# Patient Record
Sex: FEMALE | Race: BLACK OR AFRICAN AMERICAN | NOT HISPANIC OR LATINO | Employment: UNEMPLOYED | URBAN - METROPOLITAN AREA
[De-identification: names, ages, dates, MRNs, and addresses within clinical notes are randomized per-mention and may not be internally consistent; named-entity substitution may affect disease eponyms.]

---

## 2019-05-09 PROBLEM — I10 CHRONIC HYPERTENSION: Status: ACTIVE | Noted: 2019-04-25

## 2019-05-09 PROBLEM — F32.A DEPRESSION: Status: ACTIVE | Noted: 2019-04-25

## 2019-05-09 PROBLEM — T74.91XA DOMESTIC ABUSE OF ADULT: Status: ACTIVE | Noted: 2019-04-25

## 2019-05-09 PROBLEM — F41.9 ANXIETY: Status: ACTIVE | Noted: 2019-04-25

## 2019-05-09 PROBLEM — Z87.891 HISTORY OF TOBACCO ABUSE: Status: ACTIVE | Noted: 2019-04-25

## 2021-05-12 ENCOUNTER — PATIENT MESSAGE (OUTPATIENT)
Dept: RESEARCH | Facility: HOSPITAL | Age: 36
End: 2021-05-12

## 2021-07-01 ENCOUNTER — PATIENT MESSAGE (OUTPATIENT)
Dept: ADMINISTRATIVE | Facility: OTHER | Age: 36
End: 2021-07-01

## 2022-09-23 PROBLEM — I82.90 THROMBOSIS: Status: ACTIVE | Noted: 2022-09-23

## 2022-09-23 PROBLEM — B37.9 YEAST INFECTION: Status: ACTIVE | Noted: 2022-09-23

## 2022-09-23 PROBLEM — Z86.718 HISTORY OF DVT (DEEP VEIN THROMBOSIS): Status: ACTIVE | Noted: 2022-09-23

## 2022-09-25 PROBLEM — B37.31 VAGINAL YEAST INFECTION: Status: ACTIVE | Noted: 2022-09-25

## 2022-09-25 PROBLEM — I82.890 THROMBOSIS OF OVARIAN VEIN: Status: ACTIVE | Noted: 2022-09-25

## 2022-10-07 PROBLEM — I82.402 LEFT LEG DVT: Status: ACTIVE | Noted: 2022-10-07

## 2022-10-10 PROBLEM — Z79.01 ANTICOAGULATED: Status: ACTIVE | Noted: 2022-10-10

## 2022-10-10 PROBLEM — N83.202 CYST OF LEFT OVARY: Status: ACTIVE | Noted: 2022-10-10

## 2022-11-03 PROBLEM — D50.0 IRON DEFICIENCY ANEMIA DUE TO CHRONIC BLOOD LOSS: Status: ACTIVE | Noted: 2022-11-03

## 2022-11-14 PROBLEM — R87.610 ATYPICAL SQUAMOUS CELL CHANGES OF UNDETERMINED SIGNIFICANCE (ASCUS) ON CERVICAL CYTOLOGY WITH POSITIVE HIGH RISK HUMAN PAPILLOMA VIRUS (HPV): Status: ACTIVE | Noted: 2022-11-14

## 2022-11-14 PROBLEM — R87.810 ATYPICAL SQUAMOUS CELL CHANGES OF UNDETERMINED SIGNIFICANCE (ASCUS) ON CERVICAL CYTOLOGY WITH POSITIVE HIGH RISK HUMAN PAPILLOMA VIRUS (HPV): Status: ACTIVE | Noted: 2022-11-14

## 2023-01-10 PROBLEM — K04.7 DENTAL ABSCESS: Status: ACTIVE | Noted: 2023-01-10

## 2023-01-27 PROBLEM — Z00.00 PREVENTATIVE HEALTH CARE: Status: ACTIVE | Noted: 2023-01-27

## 2023-01-27 PROBLEM — R10.2 PELVIC PAIN: Status: ACTIVE | Noted: 2023-01-27

## 2023-02-02 PROBLEM — Z76.5 DRUG-SEEKING BEHAVIOR: Status: ACTIVE | Noted: 2023-02-02

## 2023-05-01 PROBLEM — Z00.00 PREVENTATIVE HEALTH CARE: Status: RESOLVED | Noted: 2023-01-27 | Resolved: 2023-05-01

## 2023-05-17 PROBLEM — R11.0 NAUSEA: Status: ACTIVE | Noted: 2023-05-17

## 2023-05-17 PROBLEM — D64.9 ANEMIA, UNSPECIFIED: Status: RESOLVED | Noted: 2023-05-17 | Resolved: 2023-05-17

## 2023-05-17 PROBLEM — D21.9 FIBROIDS: Status: ACTIVE | Noted: 2023-05-17

## 2023-05-17 PROBLEM — K37 APPENDICITIS: Status: ACTIVE | Noted: 2023-05-17

## 2023-05-17 PROBLEM — D64.9 ANEMIA, UNSPECIFIED: Status: ACTIVE | Noted: 2023-05-17

## 2023-05-17 PROBLEM — Z01.818 PREOPERATIVE CLEARANCE: Status: ACTIVE | Noted: 2023-05-17

## 2023-05-17 PROBLEM — E87.1 HYPONATREMIA: Status: ACTIVE | Noted: 2023-05-17

## 2023-05-17 PROBLEM — R10.30 LOWER ABDOMINAL PAIN: Status: ACTIVE | Noted: 2023-05-17

## 2023-05-19 PROBLEM — Z71.6 ENCOUNTER FOR TOBACCO USE CESSATION COUNSELING: Status: ACTIVE | Noted: 2023-05-19

## 2023-05-19 PROBLEM — Z72.0 TOBACCO ABUSE: Status: ACTIVE | Noted: 2023-05-19

## 2023-05-31 ENCOUNTER — SOCIAL WORK (OUTPATIENT)
Dept: ADMINISTRATIVE | Facility: OTHER | Age: 38
End: 2023-05-31
Payer: MEDICAID

## 2023-05-31 NOTE — PROGRESS NOTES
SW received a secure chat message from Dr. Norton stating pt is interested in purchasing commercial insurance. SW placed a phone call to pt @ [phone] to discuss. SW provided contact information (phone # and website) to inquire about purchasing a commercial plan through the Marketplace. SW also informed that if possible, insurance options could be possible through an employer during open enrollment. Pt verbalized understanding. No other needs were mentioned during this time.     BELKIS Joyce    [phone] (phone)  [phone] (fax)

## 2023-05-31 NOTE — PROGRESS NOTES
SW received and reviewed consult placed by Kyle Norton MD. However, consult was blank with no rationale or specific orders. SW messaged MD via Secure Chat to obtain additional information and see what need(s) pt may have. SW awaiting response from MD. SW will provided assistance as needed.     Ky Khoury, MSW    [phone]

## 2023-07-26 PROBLEM — K37 APPENDICITIS: Status: RESOLVED | Noted: 2023-05-17 | Resolved: 2023-07-26

## 2023-07-26 PROBLEM — B37.31 VAGINAL YEAST INFECTION: Status: RESOLVED | Noted: 2022-09-25 | Resolved: 2023-07-26

## 2023-07-26 PROBLEM — R11.0 NAUSEA: Status: RESOLVED | Noted: 2023-05-17 | Resolved: 2023-07-26

## 2023-10-25 PROBLEM — R10.2 CHRONIC PELVIC PAIN IN FEMALE: Status: ACTIVE | Noted: 2023-10-25

## 2023-10-25 PROBLEM — G89.29 CHRONIC PELVIC PAIN IN FEMALE: Status: ACTIVE | Noted: 2023-10-25

## 2023-10-26 PROBLEM — Z98.890 STATUS POST LAPAROSCOPY: Status: ACTIVE | Noted: 2023-10-26

## 2024-10-08 ENCOUNTER — PATIENT MESSAGE (OUTPATIENT)
Dept: URGENT CARE | Facility: CLINIC | Age: 39
End: 2024-10-08